# Patient Record
Sex: FEMALE | Race: WHITE | NOT HISPANIC OR LATINO | Employment: OTHER | URBAN - METROPOLITAN AREA
[De-identification: names, ages, dates, MRNs, and addresses within clinical notes are randomized per-mention and may not be internally consistent; named-entity substitution may affect disease eponyms.]

---

## 2017-02-17 ENCOUNTER — HOSPITAL ENCOUNTER (OUTPATIENT)
Dept: RADIOLOGY | Age: 67
Discharge: HOME | End: 2017-02-17
Payer: MEDICARE

## 2017-02-17 DIAGNOSIS — Z13.820 SCREENING FOR OSTEOPOROSIS: ICD-10-CM

## 2017-02-17 PROCEDURE — 77080 DXA BONE DENSITY AXIAL: CPT

## 2017-12-06 ENCOUNTER — GENERIC CONVERSION - ENCOUNTER (OUTPATIENT)
Dept: OTHER | Facility: OTHER | Age: 67
End: 2017-12-06

## 2017-12-06 ENCOUNTER — HOSPITAL ENCOUNTER (OUTPATIENT)
Dept: RADIOLOGY | Age: 67
Discharge: HOME | End: 2017-12-06
Payer: MEDICARE

## 2017-12-06 DIAGNOSIS — Z12.31 ENCOUNTER FOR SCREENING MAMMOGRAM FOR MALIGNANT NEOPLASM OF BREAST: ICD-10-CM

## 2017-12-06 PROCEDURE — G0202 SCR MAMMO BI INCL CAD: HCPCS

## 2018-12-12 ENCOUNTER — HOSPITAL ENCOUNTER (OUTPATIENT)
Dept: RADIOLOGY | Age: 68
Discharge: HOME | End: 2018-12-12
Payer: MEDICARE

## 2018-12-12 VITALS — HEIGHT: 64 IN | BODY MASS INDEX: 37.05 KG/M2 | WEIGHT: 217 LBS

## 2018-12-12 DIAGNOSIS — Z12.31 ENCOUNTER FOR SCREENING MAMMOGRAM FOR MALIGNANT NEOPLASM OF BREAST: ICD-10-CM

## 2018-12-12 PROCEDURE — 77067 SCR MAMMO BI INCL CAD: CPT

## 2019-12-12 ENCOUNTER — HOSPITAL ENCOUNTER (OUTPATIENT)
Dept: RADIOLOGY | Age: 69
Discharge: HOME | End: 2019-12-12
Payer: MEDICARE

## 2019-12-12 VITALS — WEIGHT: 217 LBS | BODY MASS INDEX: 37.05 KG/M2 | HEIGHT: 64 IN

## 2019-12-12 DIAGNOSIS — Z12.31 ENCOUNTER FOR SCREENING MAMMOGRAM FOR MALIGNANT NEOPLASM OF BREAST: ICD-10-CM

## 2019-12-12 PROCEDURE — 77067 SCR MAMMO BI INCL CAD: CPT

## 2020-12-09 ENCOUNTER — TRANSCRIBE ORDERS (OUTPATIENT)
Dept: LAB | Facility: CLINIC | Age: 70
End: 2020-12-09

## 2021-01-21 ENCOUNTER — TRANSCRIBE ORDERS (OUTPATIENT)
Dept: ADMINISTRATIVE | Facility: HOSPITAL | Age: 71
End: 2021-01-21

## 2021-01-21 DIAGNOSIS — Z13.820 SPECIAL SCREENING FOR OSTEOPOROSIS: Primary | ICD-10-CM

## 2021-04-28 ENCOUNTER — HOSPITAL ENCOUNTER (OUTPATIENT)
Dept: RADIOLOGY | Age: 71
Discharge: HOME | End: 2021-04-28
Payer: COMMERCIAL

## 2021-04-28 VITALS — BODY MASS INDEX: 39.27 KG/M2 | WEIGHT: 230 LBS | HEIGHT: 64 IN

## 2021-04-28 DIAGNOSIS — Z13.820 SPECIAL SCREENING FOR OSTEOPOROSIS: ICD-10-CM

## 2021-04-28 DIAGNOSIS — Z12.31 ENCOUNTER FOR SCREENING MAMMOGRAM FOR MALIGNANT NEOPLASM OF BREAST: ICD-10-CM

## 2021-04-28 PROCEDURE — 77063 BREAST TOMOSYNTHESIS BI: CPT

## 2021-04-28 PROCEDURE — 77067 SCR MAMMO BI INCL CAD: CPT

## 2021-04-28 PROCEDURE — 77080 DXA BONE DENSITY AXIAL: CPT

## 2021-07-23 ENCOUNTER — HOSPITAL ENCOUNTER (OUTPATIENT)
Dept: NON INVASIVE DIAGNOSTICS | Facility: CLINIC | Age: 71
Discharge: HOME | End: 2021-07-23
Payer: COMMERCIAL

## 2021-07-23 DIAGNOSIS — I73.9 PERIPHERAL VASCULAR DISEASE, UNSPECIFIED (HCC): ICD-10-CM

## 2021-07-23 DIAGNOSIS — I87.331 CHRONIC VENOUS HYPERTENSION W/ULCER AND INFLAMMATION INVOLV RIGHT SIDE (HCC): ICD-10-CM

## 2021-07-23 DIAGNOSIS — L97.919 CHRONIC VENOUS HYPERTENSION W/ULCER AND INFLAMMATION INVOLV RIGHT SIDE (HCC): ICD-10-CM

## 2021-07-23 PROCEDURE — 93923 UPR/LXTR ART STDY 3+ LVLS: CPT

## 2021-07-23 PROCEDURE — 93922 UPR/L XTREMITY ART 2 LEVELS: CPT | Performed by: SURGERY

## 2021-07-23 PROCEDURE — 93970 EXTREMITY STUDY: CPT

## 2021-07-23 PROCEDURE — 93925 LOWER EXTREMITY STUDY: CPT | Performed by: SURGERY

## 2021-07-23 PROCEDURE — 93925 LOWER EXTREMITY STUDY: CPT

## 2021-07-23 PROCEDURE — 93970 EXTREMITY STUDY: CPT | Performed by: SURGERY

## 2021-08-02 ENCOUNTER — TRANSCRIBE ORDERS (OUTPATIENT)
Dept: ADMINISTRATIVE | Facility: HOSPITAL | Age: 71
End: 2021-08-02

## 2021-08-02 DIAGNOSIS — L97.909: Primary | ICD-10-CM

## 2021-09-10 ENCOUNTER — CONSULT (OUTPATIENT)
Dept: VASCULAR SURGERY | Facility: CLINIC | Age: 71
End: 2021-09-10
Payer: COMMERCIAL

## 2021-09-10 VITALS
WEIGHT: 226 LBS | SYSTOLIC BLOOD PRESSURE: 118 MMHG | HEART RATE: 57 BPM | BODY MASS INDEX: 38.58 KG/M2 | HEIGHT: 64 IN | DIASTOLIC BLOOD PRESSURE: 80 MMHG

## 2021-09-10 DIAGNOSIS — I83.013 VENOUS STASIS ULCER OF RIGHT ANKLE LIMITED TO BREAKDOWN OF SKIN WITH VARICOSE VEINS (HCC): ICD-10-CM

## 2021-09-10 DIAGNOSIS — L97.311 VENOUS STASIS ULCER OF RIGHT ANKLE LIMITED TO BREAKDOWN OF SKIN WITH VARICOSE VEINS (HCC): ICD-10-CM

## 2021-09-10 DIAGNOSIS — I89.0 LYMPHEDEMA OF BOTH LOWER EXTREMITIES: ICD-10-CM

## 2021-09-10 DIAGNOSIS — E66.01 CLASS 3 SEVERE OBESITY DUE TO EXCESS CALORIES WITHOUT SERIOUS COMORBIDITY WITH BODY MASS INDEX (BMI) OF 40.0 TO 44.9 IN ADULT (HCC): ICD-10-CM

## 2021-09-10 DIAGNOSIS — L97.909: Primary | ICD-10-CM

## 2021-09-10 PROBLEM — E66.813 CLASS 3 SEVERE OBESITY DUE TO EXCESS CALORIES WITHOUT SERIOUS COMORBIDITY WITH BODY MASS INDEX (BMI) OF 40.0 TO 44.9 IN ADULT: Status: ACTIVE | Noted: 2021-09-10

## 2021-09-10 PROCEDURE — 99203 OFFICE O/P NEW LOW 30 MIN: CPT | Performed by: SURGERY

## 2021-09-10 RX ORDER — LOSARTAN POTASSIUM AND HYDROCHLOROTHIAZIDE 12.5; 1 MG/1; MG/1
TABLET ORAL
COMMUNITY
Start: 2021-08-16

## 2021-09-10 RX ORDER — CHOLECALCIFEROL (VITAMIN D3) 1250 MCG
CAPSULE ORAL WEEKLY
COMMUNITY

## 2021-09-10 NOTE — ASSESSMENT & PLAN NOTE
Chronic bilateral lower extremity lymphedema with known venous insufficiency and multiple bilateral lower extremity venous interventions including bilateral EVLT and  vein treatments for venous insufficiency/ulcerations  Uses lymphedema pumps daily to decrease leg swelling and wears compression stockings   Has not switched her compression socks for years      -Discussed that lymphedema is a lifelong chronic condition without cure and requires diligent maintenance to prevent worsening of symptoms and swelling    -Maintenance therapy includes arresting progression, reduce swelling, maintain reduction, prevent infection, restore mobility and range of motion and train patients for long term self-management    -Will provide new prescription for 20-30mmHg below knee compression socks

## 2021-09-10 NOTE — PROGRESS NOTES
Assessment/Plan:    Lymphedema of both lower extremities  Chronic bilateral lower extremity lymphedema with known venous insufficiency and multiple bilateral lower extremity venous interventions including bilateral EVLT and  vein treatments for venous insufficiency/ulcerations  Uses lymphedema pumps daily to decrease leg swelling and wears compression stockings  Has not switched her compression socks for years      -Discussed that lymphedema is a lifelong chronic condition without cure and requires diligent maintenance to prevent worsening of symptoms and swelling    -Maintenance therapy includes arresting progression, reduce swelling, maintain reduction, prevent infection, restore mobility and range of motion and train patients for long term self-management    -Will provide new prescription for 20-30mmHg below knee compression socks      Venous stasis ulcer of right ankle limited to breakdown of skin with varicose veins (HCC)  Bilateral lower extremity venous insufficiency s/p bilateral EVLT and  vein treatment in the past at OSH  She also has lymphedema and has a regular maintenance regimen including lymphedema pumps and compression socks  Her venous ulcers had healed approximately 20 years ago but recently started re-appearing mostly on the right leg  She had an ulcer that healed with local wound care but another one appeared 2 weeks ago  She is seeing a wound care specialist who is caring for her wounds  Venous reflux study demonstrated deep venous reflux bilaterally and reflux at the saphenofemoral junction  She also has incompetent perforators bilaterally     -We discussed the pathophysiology of venous disease, available treatment options and indications for treatment   -She is compliant with conservative measures  This includes the daily use of gradient compression socks, periodic leg elevation and regular exercise   However her compression socks are very old and she has not replaced them in quite some time   -New prescription for compression socks provided  Continue local wound care and conservative measures   -Follow-up in 3 months to re-evaluate wounds and symptoms  If wounds are not healing or worsening even with conservative measures, may need to consider  vein treatment  However, her deep venous reflux will continue to be a primary problem  Diagnoses and all orders for this visit:    Non-healing ulcer of lower extremity, unspecified laterality, unspecified ulcer stage ([facility] [place] [address] )  -     Ambulatory referral to Vascular Surgery  -     Compression Stocking    Class 3 severe obesity due to excess calories without serious comorbidity with body mass index (BMI) of 40 0 to 44 9 in adult [facility])    Venous stasis ulcer of right ankle limited to breakdown of skin with varicose veins (HCC)    Lymphedema of both lower extremities    Other orders  -     losartan-hydrochlorothiazide (HYZAAR) 100-12 5 MG per tablet  -     Cholecalciferol (Vitamin D3) 1 25 MG (11420 UT) CAPS; Take by mouth once a week        I have spent 30 minutes with Patient  today in which greater than 50% of this time was spent in counseling/coordination of care regarding Intructions for management, Importance of tx compliance and Impressions  Subjective:      Patient ID: Ruddy Dowling is a 66 y o  female  Patient presents today as a new patient to our office  She has a non-healing ulcer of RLE  Pt states this wound started approx 2 weeks ago  Wound is located on medial aspect of RLE  It is clean and slightly wet  Patient had CHERYL and LEVDR done 7/23/21  HPI  Ms Mak Whitaker is a 75yo female with history of obesity, bilateral lower extremity venous insufficiency with venous ulcers s/p bilateral GSV EVLT, ?vein ligation and  vein surgery in [id number], bilateral lower extremity lymphedema, left leg STSG who presents as a new referral for venous ulcers   She had previous venous ulcers and venous treatment nearly 20 yrs ago and did not have recurrence until recently  She has been seeing a wound care specialist who has been managing her wounds  She recently healed an ulcer on the right leg but a new one started 2 weeks ago around the medial malleolus  She has a very superficial ulcer on the left anterior shin as well  She uses lymphedema pumps daily and wears compression socks as well  However she has not obtained new compression socks for quite some time  She had an arterial duplex and venous reflux study performed on both legs  The arterial duplex was normal  Her venous reflux study revealed bilateral deep venous insufficiency as well as incompetent  veins bilaterally  She does not smoke and has no other complaints  The following portions of the patient's history were reviewed and updated as appropriate: allergies, current medications, past family history, past medical history, past social history, past surgical history and problem list     I have reviewed and made appropriate changes to the review of systems input by the medical assistant      Vitals:    09/10/21 1504   BP: 118/80   BP Location: Left arm   Patient Position: Sitting   Cuff Size: Standard   Pulse: 57   Weight: 103 kg (226 lb)   Height: 5' 4" (1 626 m)       Patient Active Problem List   Diagnosis    Class 3 severe obesity due to excess calories without serious comorbidity with body mass index (BMI) of 40 0 to 44 9 in adult [facility])    Lymphedema of both lower extremities    Venous stasis ulcer of right ankle limited to breakdown of skin with varicose veins (HCC)       Past Surgical History:   Procedure Laterality Date    CHOLECYSTECTOMY  1968    ENDOVASCULAR LASER THERAPY (EVLT)  2000       Family History   Problem Relation Age of Onset    Breast cancer Sister 61    Cancer Mother 80        bladder cancer    Diabetes Mother     Hypertension Father     No Known Problems Maternal Grandmother     No Known Problems Maternal Grandfather     No Known Problems Paternal Grandmother     No Known Problems Paternal Grandfather     Bone cancer Brother 79    No Known Problems Brother     No Known Problems Son     No Known Problems Son     No Known Problems Maternal Aunt     No Known Problems Maternal Aunt     No Known Problems Maternal Aunt     No Known Problems Maternal Aunt     No Known Problems Maternal Aunt     Cancer Paternal Aunt         unsure of type    No Known Problems Paternal Aunt     No Known Problems Paternal Aunt     No Known Problems Paternal Aunt     No Known Problems Paternal Aunt        Social History     Socioeconomic History    Marital status: /Civil Union     Spouse name: Not on file    Number of children: Not on file    Years of education: Not on file    Highest education level: Not on file   Occupational History    Not on file   Tobacco Use    Smoking status: Never Smoker    Smokeless tobacco: Never Used   Vaping Use    Vaping Use: Never used   Substance and Sexual Activity    Alcohol use: Not Currently    Drug use: Never    Sexual activity: Not on file   Other Topics Concern    Not on file   Social History Narrative    Not on file     Social Determinants of Health     Financial Resource Strain:     Difficulty of Paying Living Expenses:    Food Insecurity:     Worried About Running Out of Food in the Last Year:     [address] in the Last Year:    Transportation Needs:     Lack of Transportation (Medical):      Lack of Transportation (Non-Medical):    Physical Activity:     Days of Exercise per Week:     Minutes of Exercise per Session:    Stress:     Feeling of Stress :    Social Connections:     Frequency of Communication with Friends and Family:     Frequency of Social Gatherings with Friends and Family:     Attends [employer] Services:     Active Member of Clubs or Organizations:     Attends Club or Organization Meetings:     Marital Status:    Intimate Partner Violence:     Fear of Current or Ex-Partner:     Emotionally Abused:     Physically Abused:     Sexually Abused:        No Known Allergies      Current Outpatient Medications:     Cholecalciferol (Vitamin D3) 1 25 MG (25478 UT) CAPS, Take by mouth once a week, Disp: , Rfl:     losartan-hydrochlorothiazide (HYZAAR) 100-12 5 MG per tablet, , Disp: , Rfl:     sitaGLIPtin (Januvia) 100 mg tablet, , Disp: , Rfl:     traMADol (ULTRAM) 50 mg tablet, as needed, Disp: , Rfl:     Review of Systems   Constitutional: Negative  HENT: Negative  Eyes: Negative  Respiratory: Negative  Cardiovascular: Negative  Gastrointestinal: Negative  Endocrine: Negative  Genitourinary: Negative  Musculoskeletal: Negative  Skin: Positive for wound  Allergic/Immunologic: Negative  Neurological: Negative  Hematological: Negative  Psychiatric/Behavioral: Negative  I have personally reviewed the ROS entered by MA and agree as documented  Objective:      /80 (BP Location: Left arm, Patient Position: Sitting, Cuff Size: Standard)   Pulse 57   Ht 5' 4" (1 626 m)   Wt 103 kg (226 lb)   BMI 38 79 kg/m²          Physical Exam  Constitutional:       Appearance: Normal appearance  HENT:      Head: Normocephalic and atraumatic  Cardiovascular:      Rate and Rhythm: Normal rate  Pulses:           Dorsalis pedis pulses are 2+ on the right side and 2+ on the left side  Posterior tibial pulses are 2+ on the right side and 2+ on the left side  Pulmonary:      Effort: Pulmonary effort is normal    Abdominal:      Palpations: Abdomen is soft  Musculoskeletal:         General: Normal range of motion  Cervical back: Normal range of motion and neck supple  Right lower leg: Edema present  Left lower leg: Edema present  Skin:     General: Skin is warm and dry  Capillary Refill: Capillary refill takes less than 2 seconds        Comments: Bilateral venous stasis skin changes  1x1cm shallow right medial malleolus venous ulcer  Superficial 0 5x0 5cm left anterior shin venous ulcer   Neurological:      General: No focal deficit present  Mental Status: She is alert and oriented to person, place, and time  Psychiatric:         Mood and Affect: Mood normal          Behavior: Behavior normal          Thought Content:  Thought content normal          Judgment: Judgment normal

## 2021-09-10 NOTE — ASSESSMENT & PLAN NOTE
Bilateral lower extremity venous insufficiency s/p bilateral EVLT and  vein treatment in the past at OSH  She also has lymphedema and has a regular maintenance regimen including lymphedema pumps and compression socks  Her venous ulcers had healed approximately 20 years ago but recently started re-appearing mostly on the right leg  She had an ulcer that healed with local wound care but another one appeared 2 weeks ago  She is seeing a wound care specialist who is caring for her wounds  Venous reflux study demonstrated deep venous reflux bilaterally and reflux at the saphenofemoral junction  She also has incompetent perforators bilaterally     -We discussed the pathophysiology of venous disease, available treatment options and indications for treatment   -She is compliant with conservative measures  This includes the daily use of gradient compression socks, periodic leg elevation and regular exercise  However her compression socks are very old and she has not replaced them in quite some time   -New prescription for compression socks provided  Continue local wound care and conservative measures   -Follow-up in 3 months to re-evaluate wounds and symptoms  If wounds are not healing or worsening even with conservative measures, may need to consider  vein treatment  However, her deep venous reflux will continue to be a primary problem

## 2021-09-10 NOTE — PATIENT INSTRUCTIONS
Lymphedema of both lower extremities  Chronic bilateral lower extremity lymphedema with known venous insufficiency and multiple bilateral lower extremity venous interventions including bilateral EVLT and  vein treatments for venous insufficiency/ulcerations  Uses lymphedema pumps daily to decrease leg swelling and wears compression stockings  Has not switched her compression socks for years       -Discussed that lymphedema is a lifelong chronic condition without cure and requires diligent maintenance to prevent worsening of symptoms and swelling    -Maintenance therapy includes arresting progression, reduce swelling, maintain reduction, prevent infection, restore mobility and range of motion and train patients for long term self-management    -Will provide new prescription for 20-30mmHg below knee compression socks        Venous stasis ulcer of right ankle limited to breakdown of skin with varicose veins (HCC)  Bilateral lower extremity venous insufficiency s/p bilateral EVLT and  vein treatment in the past at OSH  She also has lymphedema and has a regular maintenance regimen including lymphedema pumps and compression socks  Her venous ulcers had healed approximately 20 years ago but recently started re-appearing mostly on the right leg  She had an ulcer that healed with local wound care but another one appeared 2 weeks ago  She is seeing a wound care specialist who is caring for her wounds      Venous reflux study demonstrated deep venous reflux bilaterally and reflux at the saphenofemoral junction  She also has incompetent perforators bilaterally      -We discussed the pathophysiology of venous disease, available treatment options and indications for treatment   -She is compliant with conservative measures  This includes the daily use of gradient compression socks, periodic leg elevation and regular exercise   However her compression socks are very old and she has not replaced them in quite some time   -New prescription for compression socks provided  Continue local wound care and conservative measures   -Follow-up in 3 months to re-evaluate wounds and symptoms  If wounds are not healing or worsening even with conservative measures, may need to consider  vein treatment  However, her deep venous reflux will continue to be a primary problem

## 2021-09-10 NOTE — LETTER
September 10, 2021     [address], [address]  [facility] [id number]    Patient: César Funes   YOB: 1950   Date of Visit: 9/10/2021       Dear Dr Josefina Hsieh:    Thank you for referring César Funes to me for evaluation  Below are the relevant portions of my assessment and plan of care  Diagnoses and all orders for this visit:    Lymphedema of both lower extremities  Chronic bilateral lower extremity lymphedema with known venous insufficiency and multiple bilateral lower extremity venous interventions including bilateral EVLT and  vein treatments for venous insufficiency/ulcerations  Uses lymphedema pumps daily to decrease leg swelling and wears compression stockings  Has not switched her compression socks for years       -Discussed that lymphedema is a lifelong chronic condition without cure and requires diligent maintenance to prevent worsening of symptoms and swelling    -Maintenance therapy includes arresting progression, reduce swelling, maintain reduction, prevent infection, restore mobility and range of motion and train patients for long term self-management    -Will provide new prescription for 20-30mmHg below knee compression socks        Venous stasis ulcer of right ankle limited to breakdown of skin with varicose veins (HCC)  Bilateral lower extremity venous insufficiency s/p bilateral EVLT and  vein treatment in the past at OSH  She also has lymphedema and has a regular maintenance regimen including lymphedema pumps and compression socks  Her venous ulcers had healed approximately 20 years ago but recently started re-appearing mostly on the right leg  She had an ulcer that healed with local wound care but another one appeared 2 weeks ago  She is seeing a wound care specialist who is caring for her wounds      Venous reflux study demonstrated deep venous reflux bilaterally and reflux at the saphenofemoral junction   She also has incompetent perforators bilaterally      -We discussed the pathophysiology of venous disease, available treatment options and indications for treatment   -She is compliant with conservative measures  This includes the daily use of gradient compression socks, periodic leg elevation and regular exercise  However her compression socks are very old and she has not replaced them in quite some time   -New prescription for compression socks provided  Continue local wound care and conservative measures   -Follow-up in 3 months to re-evaluate wounds and symptoms  If wounds are not healing or worsening even with conservative measures, may need to consider  vein treatment  However, her deep venous reflux will continue to be a primary problem  If you have questions, please do not hesitate to call me  I look forward to following Kay Arias along with you           Sincerely,        Charley Lopez MD        CC: Belle Dubon DPM

## 2022-04-29 ENCOUNTER — HOSPITAL ENCOUNTER (OUTPATIENT)
Dept: RADIOLOGY | Age: 72
Discharge: HOME | End: 2022-04-29
Payer: COMMERCIAL

## 2022-04-29 VITALS — HEIGHT: 64 IN | BODY MASS INDEX: 35.85 KG/M2 | WEIGHT: 210 LBS

## 2022-04-29 DIAGNOSIS — Z12.31 ENCOUNTER FOR SCREENING MAMMOGRAM FOR MALIGNANT NEOPLASM OF BREAST: ICD-10-CM

## 2022-04-29 PROCEDURE — 77063 BREAST TOMOSYNTHESIS BI: CPT

## 2022-04-29 PROCEDURE — 77067 SCR MAMMO BI INCL CAD: CPT

## 2023-05-01 ENCOUNTER — HOSPITAL ENCOUNTER (OUTPATIENT)
Dept: RADIOLOGY | Age: 73
Discharge: HOME | End: 2023-05-01

## 2023-05-01 VITALS — HEIGHT: 64 IN | BODY MASS INDEX: 35.85 KG/M2 | WEIGHT: 210 LBS

## 2023-05-01 DIAGNOSIS — Z12.31 ENCOUNTER FOR SCREENING MAMMOGRAM FOR MALIGNANT NEOPLASM OF BREAST: ICD-10-CM

## 2023-05-01 DIAGNOSIS — M85.80 OSTEOPENIA, UNSPECIFIED LOCATION: ICD-10-CM

## 2024-09-11 ENCOUNTER — HOSPITAL ENCOUNTER (OUTPATIENT)
Dept: RADIOLOGY | Age: 74
Discharge: HOME | End: 2024-09-11
Payer: COMMERCIAL

## 2024-09-11 DIAGNOSIS — Z12.31 VISIT FOR SCREENING MAMMOGRAM: ICD-10-CM

## 2024-09-11 PROCEDURE — 77067 SCR MAMMO BI INCL CAD: CPT

## 2024-09-11 PROCEDURE — 77063 BREAST TOMOSYNTHESIS BI: CPT

## 2025-07-09 ENCOUNTER — OFFICE VISIT (OUTPATIENT)
Dept: WOUND CARE | Facility: HOSPITAL | Age: 75
End: 2025-07-09
Payer: COMMERCIAL

## 2025-07-09 VITALS
SYSTOLIC BLOOD PRESSURE: 182 MMHG | HEIGHT: 63 IN | RESPIRATION RATE: 18 BRPM | HEART RATE: 52 BPM | TEMPERATURE: 97.7 F | BODY MASS INDEX: 35.26 KG/M2 | DIASTOLIC BLOOD PRESSURE: 70 MMHG | WEIGHT: 199 LBS

## 2025-07-09 DIAGNOSIS — E11.9 TYPE 2 DIABETES MELLITUS WITHOUT COMPLICATION, WITHOUT LONG-TERM CURRENT USE OF INSULIN (HCC): ICD-10-CM

## 2025-07-09 DIAGNOSIS — L97.922 NON-PRESSURE ULCER OF LEFT LOWER EXTREMITY WITH FAT LAYER EXPOSED (HCC): ICD-10-CM

## 2025-07-09 DIAGNOSIS — I87.313 CHRONIC VENOUS HYPERTENSION (IDIOPATHIC) WITH ULCER OF BILATERAL LOWER EXTREMITY (CODE) (HCC): ICD-10-CM

## 2025-07-09 DIAGNOSIS — L97.912 NON-PRESSURE ULCER OF RIGHT LOWER EXTREMITY WITH FAT LAYER EXPOSED (HCC): Primary | ICD-10-CM

## 2025-07-09 DIAGNOSIS — I89.0 LYMPHEDEMA OF BOTH LOWER EXTREMITIES: ICD-10-CM

## 2025-07-09 DIAGNOSIS — E66.813 CLASS 3 SEVERE OBESITY DUE TO EXCESS CALORIES WITHOUT SERIOUS COMORBIDITY WITH BODY MASS INDEX (BMI) OF 40.0 TO 44.9 IN ADULT: ICD-10-CM

## 2025-07-09 PROCEDURE — 97597 DBRDMT OPN WND 1ST 20 CM/<: CPT

## 2025-07-09 PROCEDURE — 99204 OFFICE O/P NEW MOD 45 MIN: CPT

## 2025-07-09 PROCEDURE — 99213 OFFICE O/P EST LOW 20 MIN: CPT

## 2025-07-09 PROCEDURE — 97598 DBRDMT OPN WND ADDL 20CM/<: CPT

## 2025-07-09 RX ORDER — LIDOCAINE 40 MG/G
CREAM TOPICAL ONCE
Status: COMPLETED | OUTPATIENT
Start: 2025-07-09 | End: 2025-07-09

## 2025-07-09 RX ADMIN — LIDOCAINE: 40 CREAM TOPICAL at 10:20

## 2025-07-09 NOTE — PATIENT INSTRUCTIONS
Orders Placed This Encounter   Procedures    Wound cleansing and dressings     Wound location Right and left legs  Change dressing every other day or as needed for excessive drainage. ( Please call Wound Center at [phone] if unable to change dressings or there is excessive drainage.)  You may remove the dressing and shower on dressing change days. Do not leave wound open to air, apply new dressing immediately.  Cleanse the wound with wound cleanser or mild soap and water, rinse, pat dry.  Apply Calcium alginate with silver to the wounds. ( Extra sent with patient.)  Cover with ABDs  Secure with rolled gauze and tape      Elastic Tubular Stocking size G BLE    Tubular elastic bandage: Apply from base of toes to behind the knee. Apply in AM, may remove for sleep.    Avoid prolonged standing in one place.    Elevate leg(s) above the level of the heart when sitting or as much as possible.       Please resume using lymphedema pumps 2 times a day.        Irene CROWLEY will order Vascular studies. Please call and schedule appointment ASAP.       If you start to have signs and symptoms of infection such as fever, chills, nausea, increased foul smelling drainage, redness, please go to Emergency Department immediatly.     [facility] request for supplies will be sent. If insurance approves it, it will be shipped to your house.    Please follow up with PCP regarding high blood pressure despite taking blood pressure medications this morning. Patient asymptomatic today. No chest pain, headache, dizziness, blurry vision.     Standing Status:   Future     Expiration Date:   7/16/2025

## 2025-07-09 NOTE — PROGRESS NOTES
Patient ID: Vj Garduno is a 74 y.o. female Date of Birth 1950       Chief Complaint   Patient presents with    New Patient Visit     BLE wounds       Allergies:  Patient has no known allergies.    Diagnosis:      Diagnosis ICD-10-CM Associated Orders   1. Non-pressure ulcer of right lower extremity with fat layer exposed ([facility])  L97.912 lidocaine (LMX) 4 % cream     Wound cleansing and dressings     VAS ARTERIAL DUPLEX- LOWER LIMB BILATERAL     Wound Procedure Treatment      2. Non-pressure ulcer of left lower extremity with fat layer exposed ([facility])  L97.922 lidocaine (LMX) 4 % cream     Wound cleansing and dressings     VAS ARTERIAL DUPLEX- LOWER LIMB BILATERAL     Wound Procedure Treatment      3. Class 3 severe obesity due to excess calories without serious comorbidity with body mass index (BMI) of 40.0 to 44.9 in adult  E66.813     Z68.41       4. Lymphedema of both lower extremities  I89.0       5. Chronic venous hypertension (idiopathic) with ulcer of bilateral lower extremity (CODE) ([facility])  I87.313       6. Type 2 diabetes mellitus without complication, without long-term current use of insulin ([facility])  E11.9               Assessment & Plan:  Bilateral lower extremity venous ulcers in the setting of chronic lymphedema.  Will recommend to begin wound management of silver alginate and ABD pads every other day and as needed for soilage/dislodgment.  Order placed for arterial duplexes as patient was unable to tolerate in office ABIs and has not had arterial studies since 2021.  Will begin compression of Tubigrip for now, once patient has obtained her arterial duplexes we hopefully can begin formal compression. Recommend patient to resume use of her lymphedema pumps.  See wound orders below  Wound is not showing any clinical s/s of infection  Debrided as below.   Elevate lower extremities and avoid prolonged standing/keeping legs in dependent position for edema management.   Counseled patient on the importance  of tight glycemic control, and adequate protein intake (3-4 servings per day) to promote wound healing.   A1C results reviewed with the patient today.   Keep wounds covered at all times, and do not leave wounds open to air as this is an infection risk. No harsh cleansers such as hydrogen peroxide, antibacterial soap or alcohol. Do not submerge wound or soak wound in any body of water, such as bath tub, pool or ocean/lake, etc.  Follow-up in 1 week(s). Call sooner with questions or concerns or any signs of infection such as redness, swelling, increased/purulent drainage, fever or chills, and increased pain.  Patient verbalized understanding and agrees with plan of care.        Subjective:   7/9/25: Patient presents to the wound care center for initial visit of bilateral lower extremity venous ulcers in the setting of lymphedema.  Patient has a history of diabetes, states that her sugars have been well-controlled.  No smoking.  Patient reports on and off wounds to her bilateral lower extremities for several years.  Patient states that her current wounds have been present for about 2 months and she has been covering them with gauze.  Patient states that in the past she was seen by wound care and Unna boots were used.  Patient states that her podiatrist told her to not use her lymphedema pumps as they said it could make her wounds worse.  Patient has been using Ace wrap for compression as her swelling is above her baseline and she cannot currently fit into her compression stockings.  Patient offers no other wound related complaints, denies increased pain.  Patient states that wounds are draining a fair amount and she changes her dressings at least once per day.  No fever or chills.        The following portions of the patient's history were reviewed and updated as appropriate:   Problem List[1]  Past Medical History[2]  Past Surgical History[3]  Family History[4]   Social History[5]   Current Medications[6]    Review of  "Systems   Constitutional:  Negative for chills and fever.   HENT:  Negative for congestion and sneezing.    Respiratory:  Negative for cough and shortness of breath.    Cardiovascular:  Positive for leg swelling.   Skin:  Positive for wound.   Psychiatric/Behavioral:  Negative for agitation.        Objective:  BP (!) 182/70   Pulse (!) 52   Temp 97.7 °F (36.5 °C)   Resp 18   Ht 5' 3\" (1.6 m)   Wt 90.3 kg (199 lb)   BMI 35.25 kg/m²   Pain Score:   7     Physical Exam  Constitutional:       General: She is awake. She is not in acute distress.     Appearance: She is not ill-appearing, toxic-appearing or diaphoretic.   HENT:      Head: Normocephalic and atraumatic.      Right Ear: External ear normal.      Left Ear: External ear normal.     Eyes:      Conjunctiva/sclera: Conjunctivae normal.       Cardiovascular:      Pulses:           Dorsalis pedis pulses are 2+ on the right side and 2+ on the left side.   Pulmonary:      Effort: Pulmonary effort is normal. No respiratory distress.     Musculoskeletal:      Right lower leg: Edema present.      Left lower leg: Edema present.     Skin:     General: Skin is warm and dry.      Findings: Wound present.      Comments: 1.  Bilateral lower extremity venous ulcers in the setting of lymphedema.  Wounds present as scattered full-thickness wounds with mixed pink/red tissue and yellow slough.  No purulent drainage or malodor.  Periwound's with scar tissue and chronic skin changes related to lymphedema/venous stasis. No erythema, warmth or lymphangitic streaking to suggest cellulitis.  Refer to wound assessment for additional details.     Neurological:      Mental Status: She is alert.     Psychiatric:         Behavior: Behavior is cooperative.         Wound 07/09/25 Vascular Ulcer Venous Leg Right (Active)   Wound Image   07/09/25 0932   Wound Description Bleeding;Yellow;Slough;Epithelialization;Pink 07/09/25 1039   Non-staged Wound Description Full thickness 07/09/25 1039 " "  Wound Length (cm) 4.8 cm 07/09/25 1039   Wound Width (cm) 6.2 cm 07/09/25 1039   Wound Depth (cm) 0.1 cm 07/09/25 1039   Wound Surface Area (cm^2) 23.37 cm^2 07/09/25 1039   Wound Volume (cm^3) 1.558 cm^3 07/09/25 1039   Calculated Wound Volume (cm^3) 2.98 cm^3 07/09/25 1039   Drainage Amount Moderate 07/09/25 1039   Drainage Description Serosanguineous 07/09/25 1039   Wanda-wound Assessment Edema;Dry;Pink 07/09/25 1039   Dressing Status Intact 07/09/25 1039       Wound 07/09/25 Vascular Ulcer Venous Leg Left (Active)   Wound Image     07/09/25 0931   Wound Description Yellow;Slough;Granulation tissue;Pink;Epithelialization;Bleeding 07/09/25 1040   Non-staged Wound Description Full thickness 07/09/25 1040   Wound Length (cm) 16.5 cm 07/09/25 1040   Wound Width (cm) 30 cm 07/09/25 1040   Wound Depth (cm) 0.2 cm 07/09/25 1040   Wound Surface Area (cm^2) 388.77 cm^2 07/09/25 1040   Wound Volume (cm^3) 51.836 cm^3 07/09/25 1040   Calculated Wound Volume (cm^3) 99 cm^3 07/09/25 1040   Drainage Amount Large 07/09/25 1040   Drainage Description Serosanguineous;Yellow 07/09/25 1040   Wanda-wound Assessment Pink;Edema;Dry 07/09/25 1040   Dressing Status Intact 07/09/25 1040           Debridement   Wound 07/09/25 Vascular Ulcer Venous Leg Right     Date/Time: 7/9/2025 9:15 AM    Universal Protocol:  Consent: Verbal consent obtained  Risks and benefits: risks, benefits and alternatives were discussed  Consent given by: patient  Time out: Immediately prior to procedure a \"time out\" was called to verify the correct patient, procedure, equipment, support staff and site/side marked as required.  Timeout called at: 7/9/2025 9:45 AM.  Patient understanding: patient states understanding of the procedure being performed  Patient identity confirmed: verbally with patient    Debridement Details  Performed by: NP  Debridement type: selective  Pain control: lidocaine 4%    Post-debridement measurements  Length (cm): 4.8  Width (cm): " "6.2  Depth (cm): 0.1  Percent debrided: 50%  Surface Area (cm^2): 23.37  Area Debrided (cm^2): 11.69  Volume (cm^3): 1.56    Devitalized tissue debrided: biofilm, exudate and slough  Instrument(s) utilized: curette  Bleeding: small  Hemostasis obtained with: pressure  Procedural pain (0-10): 0  Post-procedural pain: 0   Response to treatment: procedure was tolerated well    Debridement   Wound 07/09/25 Vascular Ulcer Venous Leg Left     Date/Time: 7/9/2025 9:15 AM    Universal Protocol:  Consent: Verbal consent obtained  Risks and benefits: risks, benefits and alternatives were discussed  Consent given by: patient  Time out: Immediately prior to procedure a \"time out\" was called to verify the correct patient, procedure, equipment, support staff and site/side marked as required.  Timeout called at: 7/9/2025 9:15 AM.  Patient understanding: patient states understanding of the procedure being performed  Patient identity confirmed: verbally with patient    Debridement Details  Performed by: NP  Debridement type: selective  Pain control: lidocaine 4%    Post-debridement measurements  Length (cm): 16.5  Width (cm): 30  Depth (cm): 0.2  Percent debrided: 40%  Surface Area (cm^2): 388.77  Area Debrided (cm^2): 155.51  Volume (cm^3): 51.84    Devitalized tissue debrided: biofilm, exudate and slough  Instrument(s) utilized: curette  Bleeding: small  Hemostasis obtained with: pressure  Procedural pain (0-10): 0  Post-procedural pain: 0   Response to treatment: procedure was tolerated well               No results found for: \"HGBA1C\"    Wound Instructions:  Orders Placed This Encounter   Procedures    Wound cleansing and dressings     Wound location Right and left legs  Change dressing every other day or as needed for excessive drainage. ( Please call Wound Center at [phone] if unable to change dressings or there is excessive drainage.)  You may remove the dressing and shower on dressing change days. Do not leave wound " "open to air, apply new dressing immediately.  Cleanse the wound with wound cleanser or mild soap and water, rinse, pat dry.  Apply Calcium alginate with silver to the wounds. ( Extra sent with patient.)  Cover with ABDs  Secure with rolled gauze and tape      Elastic Tubular Stocking size G BLE    Tubular elastic bandage: Apply from base of toes to behind the knee. Apply in AM, may remove for sleep.    Avoid prolonged standing in one place.    Elevate leg(s) above the level of the heart when sitting or as much as possible.       Please resume using lymphedema pumps 2 times a day.        Irene CROWLEY will order Vascular studies. Please call and schedule appointment ASAP.       If you start to have signs and symptoms of infection such as fever, chills, nausea, increased foul smelling drainage, redness, please go to Emergency Department immediatly.     [facility] request for supplies will be sent. If insurance approves it, it will be shipped to your house.    Please follow up with PCP regarding high blood pressure despite taking blood pressure medications this morning. Patient asymptomatic today. No chest pain, headache, dizziness, blurry vision.     Standing Status:   Future     Expiration Date:   7/16/2025    Wound Procedure Treatment     This order was created via procedure documentation    Debridement     This order was created via procedure documentation    Debridement     This order was created via procedure documentation           CARLINE Bonner, FNP-C, JEANE    Portions of the record may have been created with voice recognition software. Occasional wrong word or \"sound alike\" substitutions may have occurred due to the inherent limitations of voice recognition software. Read the chart carefully and recognize, using context, where substitutions have occurred.          Total time spent today:    Total time (face-to-face and non-face-to-face) spent on today's visit was 15 minutes. This includes preparation for " the visits (i.e. reviewing test results from date recent hospitalizations, ER/Urgent Care/primary care visits and recent consultant office visits), performance of a medically appropriate history and examination, and orders for medications or testing.          [1]   Patient Active Problem List  Diagnosis    Class 3 severe obesity due to excess calories without serious comorbidity with body mass index (BMI) of 40.0 to 44.9 in adult    Lymphedema of both lower extremities    Venous stasis ulcer of right ankle limited to breakdown of skin with varicose veins (HCC)   [2] No past medical history on file.  [3]   Past Surgical History:  Procedure Laterality Date    CHOLECYSTECTOMY  1968    ENDOVASCULAR LASER THERAPY ([facility])  2000   [4]   Family History  Problem Relation Name Age of Onset    Breast cancer Sister  60    Cancer Mother  88        bladder cancer    Diabetes Mother      Hypertension Father      No Known Problems Maternal Grandmother      No Known Problems Maternal Grandfather      No Known Problems Paternal Grandmother      No Known Problems Paternal Grandfather      Bone cancer Brother  67    No Known Problems Brother      No Known Problems Son      No Known Problems Son      No Known Problems Maternal Aunt      No Known Problems Maternal Aunt      No Known Problems Maternal Aunt      No Known Problems Maternal Aunt      No Known Problems Maternal Aunt      Cancer Paternal Aunt          unsure of type    No Known Problems Paternal Aunt      No Known Problems Paternal Aunt      No Known Problems Paternal Aunt      No Known Problems Paternal Aunt     [5]   Social History  Socioeconomic History    Marital status: /Civil Union   Tobacco Use    Smoking status: Never    Smokeless tobacco: Never   Vaping Use    Vaping status: Never Used   Substance and Sexual Activity    Alcohol use: Not Currently    Drug use: Never   [6]   Current Outpatient Medications:     Cholecalciferol (Vitamin D3) 1.25 MG (32629 UT) CAPS,  Take by mouth once a week, Disp: , Rfl:     losartan-hydrochlorothiazide (HYZAAR) 100-12.5 MG per tablet, , Disp: , Rfl:     sitaGLIPtin (Januvia) 100 mg tablet, , Disp: , Rfl:     traMADol (ULTRAM) 50 mg tablet, as needed, Disp: , Rfl:   No current facility-administered medications for this visit.

## 2025-07-09 NOTE — PROGRESS NOTES
Wound Procedure Treatment    Performed by: Helen Pressley RN  Authorized by: CARLINE Gandhi  Associated wounds:   Wound 07/09/25 Vascular Ulcer Venous Leg Right  Wound 07/09/25 Vascular Ulcer Venous Leg Left    Wound cleansed with:  NSS   Applied primary dressing:  Calcium alginate and Silver   Applied secondary dressing:  ABD and Gauze   Dressing secured with:  Clarence, Tape, Elastic tubular stocking and Size G

## 2025-07-14 ENCOUNTER — HOSPITAL ENCOUNTER (OUTPATIENT)
Dept: RADIOLOGY | Age: 75
Discharge: HOME | End: 2025-07-14
Attending: FAMILY MEDICINE
Payer: COMMERCIAL

## 2025-07-14 VITALS — BODY MASS INDEX: 37.38 KG/M2 | HEIGHT: 61 IN | WEIGHT: 198 LBS

## 2025-07-14 DIAGNOSIS — Z13.820 ENCOUNTER FOR SCREENING FOR OSTEOPOROSIS: ICD-10-CM

## 2025-07-14 PROCEDURE — 77080 DXA BONE DENSITY AXIAL: CPT

## 2025-07-16 ENCOUNTER — OFFICE VISIT (OUTPATIENT)
Dept: WOUND CARE | Facility: HOSPITAL | Age: 75
End: 2025-07-16
Payer: COMMERCIAL

## 2025-07-16 VITALS
TEMPERATURE: 97.2 F | DIASTOLIC BLOOD PRESSURE: 72 MMHG | SYSTOLIC BLOOD PRESSURE: 147 MMHG | HEART RATE: 58 BPM | RESPIRATION RATE: 16 BRPM

## 2025-07-16 DIAGNOSIS — L97.912 NON-PRESSURE ULCER OF RIGHT LOWER EXTREMITY WITH FAT LAYER EXPOSED (HCC): Primary | ICD-10-CM

## 2025-07-16 DIAGNOSIS — L97.922 NON-PRESSURE ULCER OF LEFT LOWER EXTREMITY WITH FAT LAYER EXPOSED (HCC): ICD-10-CM

## 2025-07-16 DIAGNOSIS — I87.313 CHRONIC VENOUS HYPERTENSION (IDIOPATHIC) WITH ULCER OF BILATERAL LOWER EXTREMITY (CODE) (HCC): ICD-10-CM

## 2025-07-16 DIAGNOSIS — E11.9 TYPE 2 DIABETES MELLITUS WITHOUT COMPLICATION, WITHOUT LONG-TERM CURRENT USE OF INSULIN (HCC): ICD-10-CM

## 2025-07-16 PROCEDURE — 11042 DBRDMT SUBQ TIS 1ST 20SQCM/<: CPT | Performed by: FAMILY MEDICINE

## 2025-07-16 PROCEDURE — 11045 DBRDMT SUBQ TISS EACH ADDL: CPT | Performed by: FAMILY MEDICINE

## 2025-07-16 RX ORDER — LIDOCAINE HYDROCHLORIDE 40 MG/ML
5 SOLUTION TOPICAL ONCE
Status: COMPLETED | OUTPATIENT
Start: 2025-07-16 | End: 2025-07-16

## 2025-07-16 RX ADMIN — LIDOCAINE HYDROCHLORIDE 5 ML: 40 SOLUTION TOPICAL at 10:44

## 2025-07-16 NOTE — PROGRESS NOTES
"Name: Vj Garduno      : 1950      MRN: [id number]  Encounter Provider: Amber Hodge DO  Encounter Date: 2025   Encounter department: [facility] WOUND CARE  :  Assessment & Plan  Non-pressure ulcer of right lower extremity with fat layer exposed (HCC)    Orders:    Wound cleansing and dressings; Future    lidocaine (XYLOCAINE) 4 % topical solution 5 mL    Wound Procedure Treatment    Non-pressure ulcer of left lower extremity with fat layer exposed (HCC)    Orders:    Wound cleansing and dressings; Future    lidocaine (XYLOCAINE) 4 % topical solution 5 mL    Wound Procedure Treatment    Chronic venous hypertension (idiopathic) with ulcer of bilateral lower extremity (CODE) (HCC)  Wounds are essentially unchanged  Debrided as below  Continue wound management with silver alginate, see wound orders below  Continue light compression with Tubigrip for now until arterial studies have been reviewed.  Consider increasing compression once studies have been reviewed if arterial flow allows  Keep legs elevated whenever seated and avoid prolonged standing  Follow-up in 2 weeks or call sooner with questions or concerns    Orders:    Debridement    Debridement    Type 2 diabetes mellitus without complication, without long-term current use of insulin (HCC)    No results found for: \"HGBA1C\"             History of Present Illness   Chief Complaint   Patient presents with    Follow Up Wound Care Visit     Bilat LE wounds.    Here for wound follow up.  Patient presents for follow-up of bilateral lower extremity venous ulcers.  No increased pain or drainage.  Has been using silver alginate on the wounds and wearing Tubigrip's for compression.  Patient has CircAid's at home but states that she has trouble getting them on.  She is scheduled for arterial studies next week.      Objective   /72   Pulse 58   Temp (!) 97.2 °F (36.2 °C)   Resp 16     Physical Exam  Wound 25 " "Vascular Ulcer Venous Leg Right (Active)   Wound Image   07/16/25 1125   Wound Description Bleeding;Yellow;Slough;Epithelialization;Pink;Granulation tissue 07/16/25 1042   Non-staged Wound Description Full thickness 07/16/25 1042   Wound Length (cm) 5 cm 07/16/25 1042   Wound Width (cm) 6.2 cm 07/16/25 1042   Wound Depth (cm) 0.1 cm 07/16/25 1042   Wound Surface Area (cm^2) 24.35 cm^2 07/16/25 1042   Wound Volume (cm^3) 1.623 cm^3 07/16/25 1042   Calculated Wound Volume (cm^3) 3.1 cm^3 07/16/25 1042   Change in Wound Size % -4.03 07/16/25 1042   Drainage Amount Moderate 07/16/25 1042   Drainage Description Serosanguineous;Bloody 07/16/25 1042   Wanda-wound Assessment Edema;Dry;Pink;Scaly 07/16/25 1042   Dressing Status Intact 07/16/25 1042       Wound 07/09/25 Vascular Ulcer Venous Leg Left (Active)   Wound Image    07/16/25 1124   Wound Description Yellow;Slough;Granulation tissue;Pink;Epithelialization;Bleeding 07/16/25 1042   Non-staged Wound Description Full thickness 07/16/25 1042   Wound Length (cm) 26.2 cm 07/16/25 1042   Wound Width (cm) 23 cm 07/16/25 1042   Wound Depth (cm) 0.2 cm 07/16/25 1042   Wound Surface Area (cm^2) 473.28 cm^2 07/16/25 1042   Wound Volume (cm^3) 63.104 cm^3 07/16/25 1042   Calculated Wound Volume (cm^3) 120.52 cm^3 07/16/25 1042   Change in Wound Size % -21.74 07/16/25 1042   Drainage Amount Large 07/16/25 1042   Drainage Description Serosanguineous;Yellow;Green 07/16/25 1042   Wanda-wound Assessment Pink;Edema;Dry;Scaly 07/16/25 1042   Dressing Status Intact 07/16/25 1042       Debridement   Wound 07/09/25 Vascular Ulcer Venous Leg Right     Date/Time: 7/16/2025 10:30 AM    Universal Protocol:  procedure performed by consultantConsent: Verbal consent obtained  Consent given by: patient  Time out: Immediately prior to procedure a \"time out\" was called to verify the correct patient, procedure, equipment, support staff and site/side marked as required.  Timeout called at: 7/16/2025 " "11:00 AM.  Patient understanding: patient states understanding of the procedure being performed  Patient identity confirmed: verbally with patient    Debridement Details  Performed by: physician  Debridement type: surgical  Level of debridement: subcutaneous tissue  Pain control: lidocaine 4%    Post-debridement measurements  Length (cm): 5  Width (cm): 6.2  Depth (cm): 0.2  Percent debrided: 75%  Surface Area (cm^2): 24.35  Area Debrided (cm^2): 18.26  Volume (cm^3): 3.25    Tissue and other material debrided: subcutaneous tissue  Devitalized tissue debrided: exudate and slough  Instrument(s) utilized: curette  Bleeding: small  Hemostasis obtained with: pressure  Response to treatment: procedure was tolerated well    Debridement   Wound 07/09/25 Vascular Ulcer Venous Leg Left     Date/Time: 7/16/2025 10:30 AM    Universal Protocol:  procedure performed by consultantConsent: Verbal consent obtained  Consent given by: patient  Time out: Immediately prior to procedure a \"time out\" was called to verify the correct patient, procedure, equipment, support staff and site/side marked as required.  Timeout called at: 7/16/2025 11:00 AM.  Patient understanding: patient states understanding of the procedure being performed  Patient identity confirmed: verbally with patient    Debridement Details  Performed by: physician  Debridement type: surgical  Level of debridement: subcutaneous tissue  Pain control: lidocaine 4%    Post-debridement measurements  Length (cm): 26.2  Width (cm): 23  Depth (cm): 0.2  Percent debrided: 50%  Surface Area (cm^2): 473.28  Area Debrided (cm^2): 236.64  Volume (cm^3): 63.1    Tissue and other material debrided: subcutaneous tissue  Devitalized tissue debrided: exudate and slough  Instrument(s) utilized: curette  Bleeding: small  Hemostasis obtained with: pressure  Response to treatment: procedure was tolerated well               "

## 2025-07-16 NOTE — PROGRESS NOTES
Wound Procedure Treatment    Performed by: Kristie Varela RN  Authorized by: Amber Hodge DO  Associated wounds:   Wound 07/09/25 Vascular Ulcer Venous Leg Right  Wound 07/09/25 Vascular Ulcer Venous Leg Left    Wound cleansed with:  NSS and Soap and water   Applied primary dressing:  Gelling fiber and Silver   Applied secondary dressing:  ABD   Dressing secured with:  Clarence, Tape, Size G and Elastic tubular stocking

## 2025-07-16 NOTE — PATIENT INSTRUCTIONS
Orders Placed This Encounter   Procedures    Wound cleansing and dressings     Wound location Right and left legs  Change dressing every other day or as needed for excessive drainage. ( Please call Wound Center at [phone] if unable to change dressings or there is excessive drainage.)  You may remove the dressing and shower on dressing change days. Do not leave wound open to air, apply new dressing immediately.  Cleanse the wound with wound cleanser or mild soap and water, rinse, pat dry.  Apply Calcium alginate with silver to the wounds.   Cover with ABDs  Secure with rolled gauze and tape        Elastic Tubular Stocking size G BLE   Tubular elastic bandage: Apply from base of toes to behind the knee. Apply in AM, may remove for sleep.   Avoid prolonged standing in one place.   Elevate leg(s) above the level of the heart when sitting or as much as possible.         Please resume using lymphedema pumps 2 times a day.           If you start to have signs and symptoms of infection such as fever, chills, nausea, increased foul smelling drainage, redness, please go to Emergency Department immediatly.     Standing Status:   Future     Expiration Date:   7/23/2025

## 2025-07-24 ENCOUNTER — HOSPITAL ENCOUNTER (OUTPATIENT)
Dept: NON INVASIVE DIAGNOSTICS | Facility: CLINIC | Age: 75
Discharge: HOME | End: 2025-07-24
Payer: COMMERCIAL

## 2025-07-24 DIAGNOSIS — L97.912 NON-PRESSURE ULCER OF RIGHT LOWER EXTREMITY WITH FAT LAYER EXPOSED (HCC): ICD-10-CM

## 2025-07-24 DIAGNOSIS — L97.922 NON-PRESSURE ULCER OF LEFT LOWER EXTREMITY WITH FAT LAYER EXPOSED (HCC): ICD-10-CM

## 2025-07-24 PROCEDURE — 93923 UPR/LXTR ART STDY 3+ LVLS: CPT

## 2025-07-24 PROCEDURE — 93925 LOWER EXTREMITY STUDY: CPT | Performed by: SURGERY

## 2025-07-24 PROCEDURE — 93922 UPR/L XTREMITY ART 2 LEVELS: CPT | Performed by: SURGERY

## 2025-07-24 PROCEDURE — 93925 LOWER EXTREMITY STUDY: CPT

## 2025-08-01 ENCOUNTER — OFFICE VISIT (OUTPATIENT)
Dept: WOUND CARE | Facility: HOSPITAL | Age: 75
End: 2025-08-01
Payer: COMMERCIAL

## 2025-08-01 VITALS
SYSTOLIC BLOOD PRESSURE: 176 MMHG | DIASTOLIC BLOOD PRESSURE: 72 MMHG | HEART RATE: 58 BPM | RESPIRATION RATE: 16 BRPM | TEMPERATURE: 97.2 F

## 2025-08-01 DIAGNOSIS — L97.922 NON-PRESSURE ULCER OF LEFT LOWER EXTREMITY WITH FAT LAYER EXPOSED (HCC): ICD-10-CM

## 2025-08-01 DIAGNOSIS — I87.313 CHRONIC VENOUS HYPERTENSION (IDIOPATHIC) WITH ULCER OF BILATERAL LOWER EXTREMITY (CODE) (HCC): ICD-10-CM

## 2025-08-01 DIAGNOSIS — L97.912 NON-PRESSURE ULCER OF RIGHT LOWER EXTREMITY WITH FAT LAYER EXPOSED (HCC): Primary | ICD-10-CM

## 2025-08-01 PROCEDURE — 97598 DBRDMT OPN WND ADDL 20CM/<: CPT | Performed by: FAMILY MEDICINE

## 2025-08-01 PROCEDURE — 29580 STRAPPING UNNA BOOT: CPT

## 2025-08-01 PROCEDURE — 97597 DBRDMT OPN WND 1ST 20 CM/<: CPT | Performed by: FAMILY MEDICINE

## 2025-08-01 RX ORDER — LIDOCAINE HYDROCHLORIDE 40 MG/ML
5 SOLUTION TOPICAL ONCE
Status: COMPLETED | OUTPATIENT
Start: 2025-08-01 | End: 2025-08-01

## 2025-08-01 RX ADMIN — LIDOCAINE HYDROCHLORIDE 5 ML: 40 SOLUTION TOPICAL at 13:56

## 2025-08-07 ENCOUNTER — OFFICE VISIT (OUTPATIENT)
Dept: WOUND CARE | Facility: HOSPITAL | Age: 75
End: 2025-08-07
Payer: COMMERCIAL

## 2025-08-07 VITALS
TEMPERATURE: 97.9 F | DIASTOLIC BLOOD PRESSURE: 75 MMHG | HEART RATE: 63 BPM | RESPIRATION RATE: 16 BRPM | SYSTOLIC BLOOD PRESSURE: 187 MMHG

## 2025-08-07 DIAGNOSIS — L97.922 NON-PRESSURE ULCER OF LEFT LOWER EXTREMITY WITH FAT LAYER EXPOSED (HCC): Primary | ICD-10-CM

## 2025-08-07 DIAGNOSIS — I87.313 CHRONIC VENOUS HYPERTENSION (IDIOPATHIC) WITH ULCER OF BILATERAL LOWER EXTREMITY (CODE) (HCC): ICD-10-CM

## 2025-08-07 DIAGNOSIS — L97.912 NON-PRESSURE ULCER OF RIGHT LOWER EXTREMITY WITH FAT LAYER EXPOSED (HCC): ICD-10-CM

## 2025-08-07 DIAGNOSIS — E66.813 CLASS 3 SEVERE OBESITY DUE TO EXCESS CALORIES WITHOUT SERIOUS COMORBIDITY WITH BODY MASS INDEX (BMI) OF 40.0 TO 44.9 IN ADULT: ICD-10-CM

## 2025-08-07 DIAGNOSIS — I89.0 LYMPHEDEMA OF BOTH LOWER EXTREMITIES: ICD-10-CM

## 2025-08-07 DIAGNOSIS — E11.9 TYPE 2 DIABETES MELLITUS WITHOUT COMPLICATION, WITHOUT LONG-TERM CURRENT USE OF INSULIN (HCC): ICD-10-CM

## 2025-08-07 PROCEDURE — 87147 CULTURE TYPE IMMUNOLOGIC: CPT

## 2025-08-07 PROCEDURE — 97598 DBRDMT OPN WND ADDL 20CM/<: CPT

## 2025-08-07 PROCEDURE — 87070 CULTURE OTHR SPECIMN AEROBIC: CPT

## 2025-08-07 PROCEDURE — 97597 DBRDMT OPN WND 1ST 20 CM/<: CPT

## 2025-08-07 PROCEDURE — 99214 OFFICE O/P EST MOD 30 MIN: CPT

## 2025-08-07 PROCEDURE — 87205 SMEAR GRAM STAIN: CPT

## 2025-08-07 PROCEDURE — 87077 CULTURE AEROBIC IDENTIFY: CPT

## 2025-08-07 PROCEDURE — 87186 SC STD MICRODIL/AGAR DIL: CPT

## 2025-08-07 RX ORDER — DOXYCYCLINE 100 MG/1
100 CAPSULE ORAL EVERY 12 HOURS SCHEDULED
Qty: 14 CAPSULE | Refills: 0 | Status: SHIPPED | OUTPATIENT
Start: 2025-08-07 | End: 2025-08-14

## 2025-08-07 RX ORDER — LIDOCAINE HYDROCHLORIDE 40 MG/ML
5 SOLUTION TOPICAL ONCE
Status: COMPLETED | OUTPATIENT
Start: 2025-08-07 | End: 2025-08-07

## 2025-08-07 RX ADMIN — LIDOCAINE HYDROCHLORIDE 5 ML: 40 SOLUTION TOPICAL at 14:29

## 2025-08-09 LAB
BACTERIA WND AEROBE CULT: ABNORMAL
GRAM STN SPEC: ABNORMAL
GRAM STN SPEC: ABNORMAL

## 2025-08-11 ENCOUNTER — TELEPHONE (OUTPATIENT)
Dept: WOUND CARE | Facility: HOSPITAL | Age: 75
End: 2025-08-11

## 2025-08-15 ENCOUNTER — OFFICE VISIT (OUTPATIENT)
Dept: WOUND CARE | Facility: HOSPITAL | Age: 75
End: 2025-08-15
Payer: COMMERCIAL

## 2025-08-22 ENCOUNTER — OFFICE VISIT (OUTPATIENT)
Dept: WOUND CARE | Facility: HOSPITAL | Age: 75
End: 2025-08-22
Payer: COMMERCIAL

## 2025-08-22 VITALS
RESPIRATION RATE: 18 BRPM | SYSTOLIC BLOOD PRESSURE: 181 MMHG | DIASTOLIC BLOOD PRESSURE: 77 MMHG | TEMPERATURE: 97.7 F | HEART RATE: 54 BPM

## 2025-08-22 DIAGNOSIS — E11.9 TYPE 2 DIABETES MELLITUS WITHOUT COMPLICATION, WITHOUT LONG-TERM CURRENT USE OF INSULIN (HCC): ICD-10-CM

## 2025-08-22 DIAGNOSIS — I89.0 LYMPHEDEMA OF BOTH LOWER EXTREMITIES: ICD-10-CM

## 2025-08-22 DIAGNOSIS — L97.311 VENOUS STASIS ULCER OF RIGHT ANKLE LIMITED TO BREAKDOWN OF SKIN WITH VARICOSE VEINS (HCC): ICD-10-CM

## 2025-08-22 DIAGNOSIS — I83.013 VENOUS STASIS ULCER OF RIGHT ANKLE LIMITED TO BREAKDOWN OF SKIN WITH VARICOSE VEINS (HCC): ICD-10-CM

## 2025-08-22 DIAGNOSIS — E66.813 CLASS 3 SEVERE OBESITY DUE TO EXCESS CALORIES WITHOUT SERIOUS COMORBIDITY WITH BODY MASS INDEX (BMI) OF 40.0 TO 44.9 IN ADULT: ICD-10-CM

## 2025-08-22 DIAGNOSIS — I87.313 CHRONIC VENOUS HYPERTENSION (IDIOPATHIC) WITH ULCER OF BILATERAL LOWER EXTREMITY (CODE) (HCC): ICD-10-CM

## 2025-08-22 DIAGNOSIS — L97.922 NON-PRESSURE ULCER OF LEFT LOWER EXTREMITY WITH FAT LAYER EXPOSED (HCC): Primary | ICD-10-CM

## 2025-08-22 DIAGNOSIS — L97.912 NON-PRESSURE ULCER OF RIGHT LOWER EXTREMITY WITH FAT LAYER EXPOSED (HCC): ICD-10-CM

## 2025-08-22 PROCEDURE — 97598 DBRDMT OPN WND ADDL 20CM/<: CPT

## 2025-08-22 PROCEDURE — 97597 DBRDMT OPN WND 1ST 20 CM/<: CPT

## 2025-08-22 RX ORDER — LIDOCAINE 40 MG/G
CREAM TOPICAL ONCE
Status: COMPLETED | OUTPATIENT
Start: 2025-08-22 | End: 2025-08-22

## 2025-08-22 RX ADMIN — LIDOCAINE: 40 CREAM TOPICAL at 13:45
